# Patient Record
Sex: MALE | HISPANIC OR LATINO | Employment: PART TIME | URBAN - METROPOLITAN AREA
[De-identification: names, ages, dates, MRNs, and addresses within clinical notes are randomized per-mention and may not be internally consistent; named-entity substitution may affect disease eponyms.]

---
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
## 2022-02-14 ENCOUNTER — TELEPHONE (OUTPATIENT)
Dept: PEDIATRICS CLINIC | Facility: CLINIC | Age: 22
End: 2022-02-14

## 2022-02-14 NOTE — TELEPHONE ENCOUNTER
Patient has aged out of practice please remove Dr Armin Barr from PCP field  Family made aware to find care with an adult provider

## 2022-02-16 ENCOUNTER — TELEPHONE (OUTPATIENT)
Dept: PEDIATRICS CLINIC | Facility: CLINIC | Age: 22
End: 2022-02-16

## 2022-02-17 NOTE — TELEPHONE ENCOUNTER
Returned dads call from well line and let him know that he will have to find an adult PCP for Dendanilose Sarah as he is 25years old now  I checked with Mark Dickson as well

## 2022-03-08 ENCOUNTER — TELEPHONE (OUTPATIENT)
Dept: PEDIATRICS CLINIC | Facility: CLINIC | Age: 22
End: 2022-03-08

## 2022-03-08 NOTE — TELEPHONE ENCOUNTER
Mary Cash (dad) left a message on the well line to schedule a well visit  I called dad back and gave him Dr Triston Ramirez phone number and [facility] phone number  Patient aged out need to see an adult doctor  Please remove Donis Vang MD from the PCP section     Thank you

## 2022-05-25 NOTE — TELEPHONE ENCOUNTER
05/25/22 2:34 PM     Thank you for your request  Your request has been received, reviewed, and the patient chart updated  The PCP has successfully been removed with a patient attribution note  This message will now be completed      Thank you  Felix Davalos

## 2022-05-26 NOTE — TELEPHONE ENCOUNTER
05/26/22 5:14 PM     Thank you for your request  Your request has been received, reviewed, and noted that it no longer requires attention; duplicate request  This message will now be completed      Thank you  Zari Perez

## 2022-07-31 ENCOUNTER — HOSPITAL ENCOUNTER (EMERGENCY)
Facility: HOSPITAL | Age: 22
Discharge: HOME | End: 2022-07-31
Attending: EMERGENCY MEDICINE | Admitting: EMERGENCY MEDICINE
Payer: COMMERCIAL

## 2022-07-31 VITALS
TEMPERATURE: 97.5 F | OXYGEN SATURATION: 97 % | DIASTOLIC BLOOD PRESSURE: 70 MMHG | HEART RATE: 79 BPM | RESPIRATION RATE: 16 BRPM | SYSTOLIC BLOOD PRESSURE: 166 MMHG

## 2022-07-31 DIAGNOSIS — L23.7 POISON IVY: Primary | ICD-10-CM

## 2022-07-31 PROCEDURE — 99282 EMERGENCY DEPT VISIT SF MDM: CPT

## 2022-07-31 PROCEDURE — 99284 EMERGENCY DEPT VISIT MOD MDM: CPT | Performed by: PHYSICIAN ASSISTANT

## 2022-07-31 RX ORDER — SKIN CLEANSER COMBINATION NO.8
1 CLEANSER (GRAM) TOPICAL ONCE AS NEEDED
Qty: 30 G | Refills: 0 | Status: SHIPPED | OUTPATIENT
Start: 2022-07-31

## 2022-07-31 RX ORDER — PREDNISONE 10 MG/1
TABLET ORAL
Qty: 35 TABLET | Refills: 0 | Status: SHIPPED | OUTPATIENT
Start: 2022-07-31 | End: 2022-08-15

## 2022-07-31 RX ORDER — HYDROXYZINE HYDROCHLORIDE 25 MG/1
25 TABLET, FILM COATED ORAL EVERY 6 HOURS PRN
Qty: 20 TABLET | Refills: 0 | Status: SHIPPED | OUTPATIENT
Start: 2022-07-31

## 2022-07-31 NOTE — ED PROVIDER NOTES
History  Chief Complaint   Patient presents with    Poison Ivy     Pt arrived ambulatory with c/o poison ivy sporadically on his body  Pt has been using bleach to help get rid of it, pt states it is now on his testicles and anus  25 y o  male with no significant past medical history presents to ED with chief complaint of itchy rash/poison ivy exposure  Onset of symptoms reported as 4 days ago  Location of symptoms reported as multiple sites including buttocks, testicle, bilateral arms, hands, bilateral legs and neck  Quality is reported as itchy rash  Severity is reported as moderate  Associated symptoms: denies lip tongue or facial swelling, denies fevers  Denies SOB or vomiting  Modifying factors: tried applying bleach to area without improvement  Context: went camping a few days ago after which he reports he broke out in itchy red rash  Has applied bleach to area without improvement  Positive poison ivy exposure  No known drug allergies  Reviewed past medical history and visits via EPIC:  No prior visits to this ed  History provided by:  Patient   used: No    Poison Ivy  Associated symptoms: rash    Associated symptoms: no cough, no fever, no nausea, no shortness of breath, no vomiting and no wheezing        None       Past Medical History:   Diagnosis Date    Frostbite        Past Surgical History:   Procedure Laterality Date    CIRCUMCISION         Family History   Problem Relation Age of Onset    No Known Problems Mother     No Known Problems Father     No Known Problems Sister     Heart disease Maternal Grandmother     Kidney disease Maternal Grandmother     Cancer Maternal Grandfather     Arthritis Paternal Grandmother     Alcohol abuse Neg Hx     Drug abuse Neg Hx         positive hx of mental illness in family     I have reviewed and agree with the history as documented      E-Cigarette/Vaping    E-Cigarette Use Never User      E-Cigarette/Vaping Substances    CBD Yes      Social History     Tobacco Use    Smoking status: Current Some Day Smoker     Types: Cigarettes, Cigars    Smokeless tobacco: Never Used    Tobacco comment: CBD   Vaping Use    Vaping Use: Never used   Substance Use Topics    Alcohol use: Never    Drug use: Never       Review of Systems   Constitutional: Negative for fever  HENT: Negative for facial swelling  Respiratory: Negative for cough, chest tightness, shortness of breath and wheezing  Gastrointestinal: Negative for nausea and vomiting  Skin: Positive for rash  All other systems reviewed and are negative  Physical Exam  Physical Exam  Vitals and nursing note reviewed  Constitutional:       General: He is not in acute distress  Appearance: Normal appearance  Comments: /70   Pulse 79   Temp 97 5 °F (36 4 °C) (Oral)   Resp 16   SpO2 97%      HENT:      Head: Normocephalic and atraumatic  Right Ear: External ear normal       Left Ear: External ear normal       Nose: Nose normal    Eyes:      General: No scleral icterus  Right eye: No discharge  Left eye: No discharge  Conjunctiva/sclera: Conjunctivae normal    Cardiovascular:      Rate and Rhythm: Normal rate and regular rhythm  Pulses: Normal pulses  Pulmonary:      Effort: Pulmonary effort is normal  No respiratory distress  Musculoskeletal:         General: No tenderness, deformity or signs of injury  Normal range of motion  Cervical back: Normal range of motion and neck supple  No rigidity or tenderness  Skin:     Capillary Refill: Capillary refill takes less than 2 seconds  Coloration: Skin is not jaundiced or pale  Findings: Rash present  No erythema  Comments:  There are multiple areas of raised red rash, papules and/or bullae type lesions, arranged in characteristic linear or streak-like configurations present to legs and arms and hand bilaterally, genitals, buttocks, neck     Lesions are irregularly shaped, best characterized a linear wispy distributions  No petechiae, no pustules or vesicles  Neurological:      General: No focal deficit present  Mental Status: He is alert and oriented to person, place, and time  Mental status is at baseline  Cranial Nerves: No cranial nerve deficit  Sensory: No sensory deficit  Motor: No weakness  Psychiatric:         Mood and Affect: Mood normal          Behavior: Behavior normal          Thought Content: Thought content normal          Judgment: Judgment normal          Vital Signs  ED Triage Vitals [07/31/22 0608]   Temperature Pulse Respirations Blood Pressure SpO2   97 5 °F (36 4 °C) 79 16 166/70 97 %      Temp Source Heart Rate Source Patient Position - Orthostatic VS BP Location FiO2 (%)   Oral -- -- -- --      Pain Score       --           Vitals:    07/31/22 0608   BP: 166/70   Pulse: 79         Visual Acuity      ED Medications  Medications - No data to display    Diagnostic Studies  Results Reviewed     None                 No orders to display              Procedures  Procedures         ED Course                                             MDM  Number of Diagnoses or Management Options  Poison ivy: new and does not require workup  Diagnosis management comments: ddx includes but is not limited to scabies, atopic dermatitis, strep infection, herpes zoster/shingles, fungal infection, allergic reaction, contact dermatitis, psoriasis, eczema, lice, flea bites, impetigo, pityriasis, seborrheic dermatitis, consider but doubt porphyria, vasculitis, chicken pox, measles  ED coasrse:  25year old male with no past medical history presents with c/c history and physical exam consistent with poison ivy exposure with locations including face and genitals  Treatment plan reviewed  TX 2 week coarse prednisone  Claudene Joy for topical use  Hydroxyzine for itching  F/u pcp in 3-5 days for recheck   Stable for discharge/outpatient treatment  Return to ED precautions given  Amount and/or Complexity of Data Reviewed  Review and summarize past medical records: yes    Risk of Complications, Morbidity, and/or Mortality  General comments: Disclaimers:    I have reasonably determine that electronically prescribing a controlled substance would be impractical for the patient to obtain the controlled substance prescribed by electronic prescription or would cause an untimely delay resulting in an adverse impact on the patient's medical condition        Patient was seen during the outbreak of the corona virus epidemic   Resources are limited due to the severity of patient illnesses associated with virus   Testing is also limited at this time   Discussed with patient at the time of this evaluation   Due to the fact that limited resources are available -treatment options are limited  Patient Progress  Patient progress: stable      Disposition  Final diagnoses:   Poison ivy     Time reflects when diagnosis was documented in both MDM as applicable and the Disposition within this note     Time User Action Codes Description Comment    7/31/2022  7:19 AM Suri Bucio Add [L23 7] Poison ivy       ED Disposition     ED Disposition   Discharge    Condition   Stable    Date/Time   Sun Jul 31, 2022  7:19 AM    Comment   Devan Beaver discharge to home/self care                 Follow-up Information     Follow up With Specialties Details Why Contact Info Additional [address] [facility] Emergency Department Emergency Medicine Go to  If symptoms worsen [address] [facility] [id number] [facility] Emergency Department, [address] [facility] , [address] [facility], [address] [place], [place] Family Medicine Call in 1 week follow up as needed 21 [phone]  [address] [facility] 16  [phone]             Discharge Medication List as of 7/31/2022 7:22 AM      START taking these medications    Details   hydrOXYzine HCL (ATARAX) 25 mg tablet Take 1 tablet (25 mg total) by mouth every 6 (six) hours as needed for itching, Starting Sun 7/31/2022, Normal      Poison Ivy Treatments (Zanfel) MISC Apply 1 application topically once as needed (poison ivy) for up to 1 dose, Starting Sun 7/31/2022, Normal      predniSONE 10 mg tablet Multiple Dosages:Starting Sun 7/31/2022, Until Thu 8/4/2022 at 2359, THEN Starting Fri 8/5/2022, Until Tue 8/9/2022 at 2359, THEN Starting Wed 8/10/2022, Until Sun 8/14/2022 at 2359Take 4 tablets (40 mg total) by mouth daily for 5 days, THEN 2 tablet s (20 mg total) daily for 5 days, THEN 1 tablet (10 mg total) daily for 5 days  40 mg po daily x 7 days then decrease to 20 mg PO daily x 7 days then stop   , Normal             No discharge procedures on file      PDMP Review     None          ED Provider  Electronically Signed by           Yassine Carranza PA-C  07/31/22 3485

## 2023-07-15 VITALS
WEIGHT: 176.81 LBS | OXYGEN SATURATION: 95 % | SYSTOLIC BLOOD PRESSURE: 143 MMHG | TEMPERATURE: 98.3 F | HEIGHT: 71 IN | BODY MASS INDEX: 24.75 KG/M2 | DIASTOLIC BLOOD PRESSURE: 91 MMHG | HEART RATE: 73 BPM | RESPIRATION RATE: 20 BRPM

## 2023-07-15 PROCEDURE — 99282 EMERGENCY DEPT VISIT SF MDM: CPT

## 2023-07-16 ENCOUNTER — HOSPITAL ENCOUNTER (EMERGENCY)
Facility: HOSPITAL | Age: 23
Discharge: HOME | End: 2023-07-16
Attending: EMERGENCY MEDICINE
Payer: COMMERCIAL

## 2023-07-16 DIAGNOSIS — L23.7 CONTACT DERMATITIS DUE TO POISON IVY: Primary | ICD-10-CM

## 2023-07-16 RX ORDER — PREDNISONE 20 MG/1
60 TABLET ORAL ONCE
Status: COMPLETED | OUTPATIENT
Start: 2023-07-16 | End: 2023-07-16

## 2023-07-16 RX ORDER — DIAPER,BRIEF,INFANT-TODD,DISP
EACH MISCELLANEOUS ONCE
Status: COMPLETED | OUTPATIENT
Start: 2023-07-16 | End: 2023-07-16

## 2023-07-16 RX ORDER — PREDNISONE 20 MG/1
TABLET ORAL
Qty: 21 TABLET | Refills: 0 | Status: SHIPPED | OUTPATIENT
Start: 2023-07-16 | End: 2023-07-28

## 2023-07-16 RX ADMIN — HYDROCORTISONE: 10 CREAM TOPICAL at 00:53

## 2023-07-16 RX ADMIN — PREDNISONE 60 MG: 20 TABLET ORAL at 00:45

## 2023-07-16 NOTE — ED PROVIDER NOTES
History  Chief Complaint   Patient presents with   • Poison Ivy     Pt reports poison ivy on both calves, and left thigh and chest      Patient is a 68-year-old male with no significant past medical history presenting to the emergency department for evaluation of poison ivy. Patient states that he went ayush jumping and developed a rash to his bilateral calves. Rash has been getting worse and now spread to his left hamstring and his chest area. Rash is itchy. No fevers, chills. No other complaints at this time. Prior to Admission Medications   Prescriptions Last Dose Informant Patient Reported? Taking? Poison Ivy Treatments (Zanfel) MISC   No No   Sig: Apply 1 application topically once as needed (poison ivy) for up to 1 dose   hydrOXYzine HCL (ATARAX) 25 mg tablet   No No   Sig: Take 1 tablet (25 mg total) by mouth every 6 (six) hours as needed for itching      Facility-Administered Medications: None       Past Medical History:   Diagnosis Date   • Frostbite        Past Surgical History:   Procedure Laterality Date   • CIRCUMCISION         Family History   Problem Relation Age of Onset   • No Known Problems Mother    • No Known Problems Father    • No Known Problems Sister    • Heart disease Maternal Grandmother    • Kidney disease Maternal Grandmother    • Cancer Maternal Grandfather    • Arthritis Paternal Grandmother    • Alcohol abuse Neg Hx    • Drug abuse Neg Hx         positive hx of mental illness in family     I have reviewed and agree with the history as documented.     E-Cigarette/Vaping   • E-Cigarette Use Never User      E-Cigarette/Vaping Substances   • Nicotine Yes    • CBD Yes      Social History     Tobacco Use   • Smoking status: Some Days     Types: Cigarettes, Cigars   • Smokeless tobacco: Never   • Tobacco comments:     CBD   Vaping Use   • Vaping Use: Never used   Substance Use Topics   • Alcohol use: Never   • Drug use: Never       Review of Systems   Constitutional: Negative for chills and fever. Skin: Positive for rash. All other systems reviewed and are negative. Physical Exam  Physical Exam  Vitals reviewed. Constitutional:       General: He is not in acute distress. Appearance: Normal appearance. He is normal weight. He is not ill-appearing, toxic-appearing or diaphoretic. HENT:      Head: Normocephalic and atraumatic. Right Ear: External ear normal.      Left Ear: External ear normal.   Eyes:      General: No scleral icterus. Right eye: No discharge. Left eye: No discharge. Extraocular Movements: Extraocular movements intact. Conjunctiva/sclera: Conjunctivae normal.   Cardiovascular:      Rate and Rhythm: Normal rate and regular rhythm. Heart sounds: Normal heart sounds. No murmur heard. No friction rub. No gallop. Pulmonary:      Effort: Pulmonary effort is normal. No respiratory distress. Breath sounds: Normal breath sounds. No stridor. No wheezing, rhonchi or rales. Musculoskeletal:      Cervical back: Normal range of motion and neck supple. Skin:     General: Skin is warm and dry. Findings: Rash (erythematous, blanchable, maculopapular rash with scattered vesicles present to bilateral calves, left hamstring, upper chest wall. COnsistent with allergic dermatitis from poison ivy) present. Neurological:      Mental Status: He is alert and oriented to person, place, and time.    Psychiatric:         Mood and Affect: Mood normal.         Behavior: Behavior normal.         Vital Signs  ED Triage Vitals [07/15/23 2338]   Temperature Pulse Respirations Blood Pressure SpO2   98.3 °F (36.8 °C) 73 20 143/91 95 %      Temp Source Heart Rate Source Patient Position - Orthostatic VS BP Location FiO2 (%)   Temporal Monitor Sitting Left arm --      Pain Score       --           Vitals:    07/15/23 2338   BP: 143/91   Pulse: 73   Patient Position - Orthostatic VS: Sitting         Visual Acuity      ED Medications  Medications   hydrocortisone 1 % cream (has no administration in time range)   predniSONE tablet 60 mg (has no administration in time range)       Diagnostic Studies  Results Reviewed     None                 No orders to display              Procedures  Procedures         ED Course         CRAFFT    Flowsheet Row Most Recent Value   JENNIFER Initial Screen: During the past 12 months, did you:    1. Drink any alcohol (more than a few sips)? No Filed at: 07/15/2023 9731   2. Smoke any marijuana or hashish No Filed at: 07/15/2023 2351   3. Use anything else to get high? ("anything else" includes illegal drugs, over the counter and prescription drugs, and things that you sniff or 'correia')? No Filed at: 07/15/2023 2351                                          Medical Decision Making  Patient presenting for evaluation of poison ivy to his bilateral lower extremities, chest.  Upon arrival patient appears comfortable. He is not in any acute distress. His vital signs are unremarkable. On examination he does have an erythematous, blanchable, maculopapular rash with scattered vesicles to his bilateral lower extremities and upper chest that is consistent with allergic dermatitis to poison ivy. Patient treated with hydrocortisone cream and oral steroids. He was given instructions to follow-up with his primary care provider. Strict return precautions were discussed. He is in stable condition at time of discharge. Contact dermatitis due to poison ivy: acute illness or injury  Risk  Prescription drug management.           Disposition  Final diagnoses:   Contact dermatitis due to poison ivy     Time reflects when diagnosis was documented in both MDM as applicable and the Disposition within this note     Time User Action Codes Description Comment    7/16/2023 12:28 AM Carla Cash Add [L23.7] Contact dermatitis due to poison ivy       ED Disposition     ED Disposition   Discharge    Condition   Stable Date/Time   Sun Jul 16, 2023 12:28 AM    Comment   Ary Meyer discharge to home/self care. Follow-up Information     Follow up With Specialties Details Why Contact Info Additional [facility] Emergency Department Emergency Medicine Go to  If symptoms worsen [address] [facility] Emergency Department, [place], [place], [address]          Patient's Medications   Discharge Prescriptions    PREDNISONE 20 MG TABLET    Take 3 tablets (60 mg total) by mouth daily for 2 days, THEN 2.5 tablets (50 mg total) daily for 2 days, THEN 2 tablets (40 mg total) daily for 2 days, THEN 1.5 tablets (30 mg total) daily for 2 days, THEN 1 tablet (20 mg total) daily for 2 days, THEN 0.5 tablets (10 mg total) daily for 2 days. Start Date: 7/16/2023 End Date: 7/28/2023       Order Dose: --       Quantity: 21 tablet    Refills: 0       No discharge procedures on file.     PDMP Review     None          ED Provider  Electronically Signed by           Sabina Deluna PA-C  07/16/23 0032